# Patient Record
Sex: MALE | URBAN - METROPOLITAN AREA
[De-identification: names, ages, dates, MRNs, and addresses within clinical notes are randomized per-mention and may not be internally consistent; named-entity substitution may affect disease eponyms.]

---

## 2022-05-13 ENCOUNTER — OFFICE VISIT (OUTPATIENT)
Dept: URBAN - METROPOLITAN AREA CLINIC 89 | Facility: CLINIC | Age: 67
End: 2022-05-13
Payer: MEDICARE

## 2022-05-13 DIAGNOSIS — H43.391 OTHER VITREOUS OPACITIES, RIGHT EYE: ICD-10-CM

## 2022-05-13 DIAGNOSIS — H02.821 CYSTS OF RIGHT UPPER EYELID: ICD-10-CM

## 2022-05-13 DIAGNOSIS — H17.12 CENTRAL CORNEAL OPACITY OF LEFT EYE: Primary | ICD-10-CM

## 2022-05-13 DIAGNOSIS — H25.13 AGE-RELATED NUCLEAR CATARACT, BILATERAL: ICD-10-CM

## 2022-05-13 DIAGNOSIS — H52.223 REGULAR ASTIGMATISM, BILATERAL: ICD-10-CM

## 2022-05-13 PROCEDURE — 92285 EXTERNAL OCULAR PHOTOGRAPHY: CPT | Performed by: OPTOMETRIST

## 2022-05-13 PROCEDURE — 92004 COMPRE OPH EXAM NEW PT 1/>: CPT | Performed by: OPTOMETRIST

## 2022-05-13 RX ORDER — PREDNISOLONE ACETATE 10 MG/ML
1 % SUSPENSION/ DROPS OPHTHALMIC
Qty: 5 | Refills: 0 | Status: ACTIVE
Start: 2022-05-13

## 2022-05-13 ASSESSMENT — INTRAOCULAR PRESSURE
OS: 16
OD: 18

## 2022-05-13 NOTE — IMPRESSION/PLAN
Impression: Other vitreous opacities, right eye: H43.391. Plan: Discussion with patient regarding vitreous floaters and that while it is generally a benign condition, it can be bothersome. Patient advised of signs/symptoms associated with retinal tear/break/detachment and to RTC ASAP.

## 2022-05-13 NOTE — IMPRESSION/PLAN
Impression: Cysts of right upper eyelid: H02.821.  Plan: Start prednisolone acetate 1% bid x 10 days

## 2022-05-13 NOTE — IMPRESSION/PLAN
Impression: Central corneal opacity of left eye: H17.12. Plan: Patient denies history of Herpes Zoster. Photo documented scar and discussed with patient.